# Patient Record
Sex: MALE | Race: WHITE | NOT HISPANIC OR LATINO | URBAN - METROPOLITAN AREA
[De-identification: names, ages, dates, MRNs, and addresses within clinical notes are randomized per-mention and may not be internally consistent; named-entity substitution may affect disease eponyms.]

---

## 2019-08-16 PROBLEM — Z00.00 ENCOUNTER FOR PREVENTIVE HEALTH EXAMINATION: Status: ACTIVE | Noted: 2019-08-16

## 2019-09-05 ENCOUNTER — OUTPATIENT (OUTPATIENT)
Dept: OUTPATIENT SERVICES | Facility: HOSPITAL | Age: 54
LOS: 1 days | Discharge: HOME | End: 2019-09-05

## 2019-09-05 DIAGNOSIS — D72.89 OTHER SPECIFIED DISORDERS OF WHITE BLOOD CELLS: ICD-10-CM

## 2019-09-06 ENCOUNTER — OUTPATIENT (OUTPATIENT)
Dept: OUTPATIENT SERVICES | Facility: HOSPITAL | Age: 54
LOS: 1 days | End: 2019-09-06
Payer: COMMERCIAL

## 2019-09-06 ENCOUNTER — RESULT REVIEW (OUTPATIENT)
Age: 54
End: 2019-09-06

## 2019-09-06 ENCOUNTER — APPOINTMENT (OUTPATIENT)
Dept: HEMATOLOGY ONCOLOGY | Facility: CLINIC | Age: 54
End: 2019-09-06
Payer: COMMERCIAL

## 2019-09-06 ENCOUNTER — TRANSCRIPTION ENCOUNTER (OUTPATIENT)
Age: 54
End: 2019-09-06

## 2019-09-06 VITALS
BODY MASS INDEX: 27.28 KG/M2 | RESPIRATION RATE: 16 BRPM | HEIGHT: 68.6 IN | HEART RATE: 81 BPM | TEMPERATURE: 97.5 F | OXYGEN SATURATION: 97 % | WEIGHT: 182.1 LBS | SYSTOLIC BLOOD PRESSURE: 111 MMHG | DIASTOLIC BLOOD PRESSURE: 68 MMHG

## 2019-09-06 DIAGNOSIS — D72.820 LYMPHOCYTOSIS (SYMPTOMATIC): ICD-10-CM

## 2019-09-06 DIAGNOSIS — F17.200 NICOTINE DEPENDENCE, UNSPECIFIED, UNCOMPLICATED: ICD-10-CM

## 2019-09-06 DIAGNOSIS — Z78.9 OTHER SPECIFIED HEALTH STATUS: ICD-10-CM

## 2019-09-06 LAB
EOSINOPHIL # BLD AUTO: 0.3 K/UL — SIGNIFICANT CHANGE UP (ref 0–0.5)
EOSINOPHIL NFR BLD AUTO: 2 % — SIGNIFICANT CHANGE UP (ref 0–6)
ERYTHROCYTE [SEDIMENTATION RATE] IN BLOOD: 5 MM/HR — SIGNIFICANT CHANGE UP (ref 0–20)
FOLATE SERPL-MCNC: >20 NG/ML
HCT VFR BLD CALC: 42.6 % — SIGNIFICANT CHANGE UP (ref 39–50)
HGB BLD-MCNC: 14.2 G/DL — SIGNIFICANT CHANGE UP (ref 13–17)
LYMPHOCYTES # BLD AUTO: 11.7 K/UL — HIGH (ref 1–3.3)
LYMPHOCYTES # BLD AUTO: 66 % — HIGH (ref 13–44)
MCHC RBC-ENTMCNC: 24.2 PG — LOW (ref 27–34)
MCHC RBC-ENTMCNC: 33.2 G/DL — SIGNIFICANT CHANGE UP (ref 32–36)
MCV RBC AUTO: 73 FL — LOW (ref 80–100)
MONOCYTES # BLD AUTO: 0.6 K/UL — SIGNIFICANT CHANGE UP (ref 0–0.9)
MONOCYTES NFR BLD AUTO: 5 % — SIGNIFICANT CHANGE UP (ref 2–14)
NEUTROPHILS # BLD AUTO: 3.8 K/UL — SIGNIFICANT CHANGE UP (ref 1.8–7.4)
NEUTROPHILS NFR BLD AUTO: 27 % — LOW (ref 43–77)
PLAT MORPH BLD: NORMAL — SIGNIFICANT CHANGE UP
PLATELET # BLD AUTO: 89 K/UL — LOW (ref 150–400)
RBC # BLD: 5.84 M/UL — HIGH (ref 4.2–5.8)
RBC # FLD: 14.6 % — HIGH (ref 10.3–14.5)
RBC BLD AUTO: SIGNIFICANT CHANGE UP
VIT B12 SERPL-MCNC: 693 PG/ML
WBC # BLD: 16.4 K/UL — HIGH (ref 3.8–10.5)
WBC # FLD AUTO: 16.4 K/UL — HIGH (ref 3.8–10.5)

## 2019-09-06 PROCEDURE — 87205 SMEAR GRAM STAIN: CPT

## 2019-09-06 PROCEDURE — 88184 FLOWCYTOMETRY/ TC 1 MARKER: CPT

## 2019-09-06 PROCEDURE — 88319 ENZYME HISTOCHEMISTRY: CPT

## 2019-09-06 PROCEDURE — 88185 FLOWCYTOMETRY/TC ADD-ON: CPT

## 2019-09-06 PROCEDURE — 88189 FLOWCYTOMETRY/READ 16 & >: CPT

## 2019-09-06 PROCEDURE — 99205 OFFICE O/P NEW HI 60 MIN: CPT

## 2019-09-06 NOTE — ASSESSMENT
[FreeTextEntry1] : 53 y/o M with no past medical history with lymphocytosis, also noted to be thrombocytopenic. \par \par -Other than unintentional weight loss, patient has no other symptoms of systemic illness.\par -The persistent nature of patient's lymphocytosis as well as his thrombocytopenia do make further evaluation appropriate. \par -No lymphadenopathy on exam. \par -Will check peripheral flow cytometry to assess if these lymphocytes are a monoclonal population. \par -CRP/ESR/CANDIDA to assess for systemic inflammation, as well as HIV and acute hepatitis panel although risk profile is low. \par -If not a monoclonal process, there is a chance that his smoking habit can be a possible cause. \par -Will review peripheral smear. \par -Will follow up in 1 month with repeat counts. May ultimately require bone marrow biopsy.

## 2019-09-06 NOTE — PHYSICAL EXAM
[Fully active, able to carry on all pre-disease performance without restriction] : Status 0 - Fully active, able to carry on all pre-disease performance without restriction [Normal] : affect appropriate [de-identified] : no lymphadenopathy noted in cervical, supraclavicular, axillary or inguinal chains.

## 2019-09-06 NOTE — CONSULT LETTER
[Dear  ___] : Dear  [unfilled], [Consult Letter:] : I had the pleasure of evaluating your patient, [unfilled]. [( Thank you for referring [unfilled] for consultation for _____ )] : Thank you for referring [unfilled] for consultation for [unfilled] [Please see my note below.] : Please see my note below. [Consult Closing:] : Thank you very much for allowing me to participate in the care of this patient.  If you have any questions, please do not hesitate to contact me. [Sincerely,] : Sincerely, [FreeTextEntry3] : Bradley Goldberg M.D. \par Hematology/Oncology\par [facility] [place]\par [phone]\par

## 2019-09-06 NOTE — REVIEW OF SYSTEMS
[Recent Change In Weight] : ~T recent weight change [Fever] : no fever [Chills] : no chills [Night Sweats] : no night sweats [Fatigue] : no fatigue [Eye Pain] : no eye pain [Vision Problems] : no vision problems [Nosebleeds] : no nosebleeds [Dysphagia] : no dysphagia [Hoarseness] : no hoarseness [Chest Pain] : no chest pain [Palpitations] : no palpitations [Wheezing] : no wheezing [Shortness Of Breath] : no shortness of breath [Cough] : no cough [Abdominal Pain] : no abdominal pain [Vomiting] : no vomiting [Constipation] : no constipation [Dysuria] : no dysuria [Joint Pain] : no joint pain [Joint Stiffness] : no joint stiffness [Confused] : no confusion [Skin Rash] : no skin rash [Dizziness] : no dizziness [Anxiety] : no anxiety [Depression] : no depression [Easy Bruising] : no tendency for easy bruising [Easy Bleeding] : no tendency for easy bleeding [Swollen Glands] : no swollen glands

## 2019-09-09 LAB — TM INTERPRETATION: SIGNIFICANT CHANGE UP

## 2019-09-11 LAB
ALBUMIN SERPL ELPH-MCNC: 4.8 G/DL
ALP BLD-CCNC: 93 U/L
ALT SERPL-CCNC: 22 U/L
ANA SER IF-ACNC: NEGATIVE
ANION GAP SERPL CALC-SCNC: 14 MMOL/L
AST SERPL-CCNC: 22 U/L
BILIRUB SERPL-MCNC: 0.5 MG/DL
BUN SERPL-MCNC: 19 MG/DL
CALCIUM SERPL-MCNC: 9.3 MG/DL
CHLORIDE SERPL-SCNC: 101 MMOL/L
CO2 SERPL-SCNC: 25 MMOL/L
CREAT SERPL-MCNC: 1.02 MG/DL
CRP SERPL-MCNC: 0.24 MG/DL
GLUCOSE SERPL-MCNC: 93 MG/DL
HAV IGM SER QL: NONREACTIVE
HBV CORE IGM SER QL: NONREACTIVE
HBV SURFACE AG SER QL: NONREACTIVE
HCV AB SER QL: NONREACTIVE
HCV S/CO RATIO: 0.19 S/CO
POTASSIUM SERPL-SCNC: 4.8 MMOL/L
PROT SERPL-MCNC: 7 G/DL
SODIUM SERPL-SCNC: 140 MMOL/L

## 2019-10-01 ENCOUNTER — RESULT REVIEW (OUTPATIENT)
Age: 54
End: 2019-10-01

## 2019-10-01 ENCOUNTER — APPOINTMENT (OUTPATIENT)
Dept: HEMATOLOGY ONCOLOGY | Facility: CLINIC | Age: 54
End: 2019-10-01
Payer: COMMERCIAL

## 2019-10-01 VITALS
BODY MASS INDEX: 27.04 KG/M2 | TEMPERATURE: 97.7 F | RESPIRATION RATE: 16 BRPM | WEIGHT: 181 LBS | SYSTOLIC BLOOD PRESSURE: 111 MMHG | DIASTOLIC BLOOD PRESSURE: 72 MMHG | OXYGEN SATURATION: 100 % | HEART RATE: 75 BPM

## 2019-10-01 DIAGNOSIS — R63.4 ABNORMAL WEIGHT LOSS: ICD-10-CM

## 2019-10-01 LAB
EOSINOPHIL # BLD AUTO: 0.1 K/UL — SIGNIFICANT CHANGE UP (ref 0–0.5)
EOSINOPHIL NFR BLD AUTO: 3 % — SIGNIFICANT CHANGE UP (ref 0–6)
HCT VFR BLD CALC: 43.7 % — SIGNIFICANT CHANGE UP (ref 39–50)
HGB BLD-MCNC: 14.7 G/DL — SIGNIFICANT CHANGE UP (ref 13–17)
LYMPHOCYTES # BLD AUTO: 11.5 K/UL — HIGH (ref 1–3.3)
LYMPHOCYTES # BLD AUTO: 59 % — HIGH (ref 13–44)
MCHC RBC-ENTMCNC: 24.8 PG — LOW (ref 27–34)
MCHC RBC-ENTMCNC: 33.6 G/DL — SIGNIFICANT CHANGE UP (ref 32–36)
MCV RBC AUTO: 73.6 FL — LOW (ref 80–100)
MONOCYTES # BLD AUTO: 0.6 K/UL — SIGNIFICANT CHANGE UP (ref 0–0.9)
MONOCYTES NFR BLD AUTO: 8 % — SIGNIFICANT CHANGE UP (ref 2–14)
NEUTROPHILS # BLD AUTO: 4.3 K/UL — SIGNIFICANT CHANGE UP (ref 1.8–7.4)
NEUTROPHILS NFR BLD AUTO: 30 % — LOW (ref 43–77)
PLAT MORPH BLD: NORMAL — SIGNIFICANT CHANGE UP
PLATELET # BLD AUTO: 103 K/UL — LOW (ref 150–400)
RBC # BLD: 5.94 M/UL — HIGH (ref 4.2–5.8)
RBC # FLD: 13.9 % — SIGNIFICANT CHANGE UP (ref 10.3–14.5)
RBC BLD AUTO: SIGNIFICANT CHANGE UP
WBC # BLD: 16.8 K/UL — HIGH (ref 3.8–10.5)
WBC # FLD AUTO: 16.8 K/UL — HIGH (ref 3.8–10.5)

## 2019-10-01 PROCEDURE — 99214 OFFICE O/P EST MOD 30 MIN: CPT

## 2019-10-01 NOTE — REVIEW OF SYSTEMS
[Recent Change In Weight] : ~T recent weight change [Fever] : no fever [Chills] : no chills [Night Sweats] : no night sweats [Eye Pain] : no eye pain [Fatigue] : no fatigue [Vision Problems] : no vision problems [Dysphagia] : no dysphagia [Hoarseness] : no hoarseness [Nosebleeds] : no nosebleeds [Chest Pain] : no chest pain [Palpitations] : no palpitations [Wheezing] : no wheezing [Shortness Of Breath] : no shortness of breath [Cough] : no cough [Vomiting] : no vomiting [Abdominal Pain] : no abdominal pain [Dysuria] : no dysuria [Constipation] : no constipation [Joint Stiffness] : no joint stiffness [Joint Pain] : no joint pain [Skin Rash] : no skin rash [Confused] : no confusion [Anxiety] : no anxiety [Dizziness] : no dizziness [Easy Bleeding] : no tendency for easy bleeding [Depression] : no depression [Swollen Glands] : no swollen glands [Easy Bruising] : no tendency for easy bruising

## 2019-10-01 NOTE — HISTORY OF PRESENT ILLNESS
[de-identified] : 55 y/o M with no past medical history presents as referral from PCP after persistent lymphocytosis noticed on his blood work at routine physical. He reports that he feels in his usual state of health. The only abnormality he has noticed is that he has unintentionally lost about 10 pounds since last year. He reports that normally he is not the biggest eater, and at times he will even skip meals, but this habit of his has not changed. He used to notice that on very hot and humid days he would get a feeling of lightheadedness but that actually stopped one and a half years ago. Otherwise, completely has felt normal. No lumps noticed on neck, chest, armpits, or groin. He reports no night sweats or increasing fatigue, no fevers or chills (although did note he had a day long cold two months ago during which he had a fever, this has resolved), denies any SOB or chest pain / palpitations. 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 \par \par On further evaluation, patient had a flow cytometry test which showed monotypic B-cells (45% of cells), positive for kappa (dim), CD19, dim CD20, CD23, CD5; they were negative for CD10, CD38 and FMC-7. 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  [de-identified] : Patient reports that he has not had any change in symptoms since his last visit. He generally feels well. No night sweats or chills, no fevers, no infections, his appetite is good but he generally cannot eat a lot. He reports no pain symptoms. No abdominal swelling. He feels normal strength. No bleeding symptoms.

## 2019-10-01 NOTE — PHYSICAL EXAM
[Fully active, able to carry on all pre-disease performance without restriction] : Status 0 - Fully active, able to carry on all pre-disease performance without restriction [Normal] : affect appropriate [de-identified] : no lymphadenopathy noted in cervical, supraclavicular, axillary or inguinal chains.

## 2019-10-01 NOTE — ASSESSMENT
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 \par \par -Will check FISH w/ CLL panel today, checking t(11;14) to rule out mantle cell lymphoma as well as looking for prognostic indicators. Also will check IGVH mutational analysis and cytogenetics. \par -Checking immunoglobulin levels as well as SPEP. \par -Will check LDH level. \par -Other than slight unintentional weight loss, patient has no other symptoms of systemic illness.\par -No lymphadenopathy or splenomegaly on exam. \par \par Discussed the possible diagnosis of CLL with the patient. Described that this is a chronic lymphocytic leukemia which can be slowly progressive in nature and may not require treatment for a long period of time. Discussed that the one high risk feature he has is thrombocytopenia (Stringer stage IV) and that he may require treatment earlier rather than late if this issue persists and worsens.

## 2019-10-02 ENCOUNTER — LABORATORY RESULT (OUTPATIENT)
Age: 54
End: 2019-10-02

## 2019-10-02 LAB
ALBUMIN MFR SERPL ELPH: 63.7 %
ALBUMIN SERPL-MCNC: 4.5 G/DL
ALBUMIN/GLOB SERPL: 1.8 RATIO
ALPHA1 GLOB MFR SERPL ELPH: 3.3 %
ALPHA1 GLOB SERPL ELPH-MCNC: 0.2 G/DL
ALPHA2 GLOB MFR SERPL ELPH: 8.7 %
ALPHA2 GLOB SERPL ELPH-MCNC: 0.6 G/DL
B-GLOBULIN MFR SERPL ELPH: 10.5 %
B-GLOBULIN SERPL ELPH-MCNC: 0.7 G/DL
B2 MICROGLOB SERPL-MCNC: 1.8 MG/L
DEPRECATED KAPPA LC FREE/LAMBDA SER: 1.31 RATIO
GAMMA GLOB FLD ELPH-MCNC: 1 G/DL
GAMMA GLOB MFR SERPL ELPH: 13.8 %
IGA SER QL IEP: 198 MG/DL
IGG SER QL IEP: 1021 MG/DL
IGM SER QL IEP: 57 MG/DL
INTERPRETATION SERPL IEP-IMP: NORMAL
KAPPA LC CSF-MCNC: 1.47 MG/DL
KAPPA LC SERPL-MCNC: 1.92 MG/DL
LDH SERPL-CCNC: 160 U/L
M PROTEIN SPEC IFE-MCNC: NORMAL
PROT SERPL-MCNC: 7 G/DL
PROT SERPL-MCNC: 7 G/DL

## 2019-10-29 LAB — IGVH MUTATION ANALYSIS: POSITIVE

## 2020-01-13 ENCOUNTER — OUTPATIENT (OUTPATIENT)
Dept: OUTPATIENT SERVICES | Facility: HOSPITAL | Age: 55
LOS: 1 days | Discharge: HOME | End: 2020-01-13

## 2020-01-13 DIAGNOSIS — D72.89 OTHER SPECIFIED DISORDERS OF WHITE BLOOD CELLS: ICD-10-CM

## 2020-01-14 ENCOUNTER — RESULT REVIEW (OUTPATIENT)
Age: 55
End: 2020-01-14

## 2020-01-14 ENCOUNTER — APPOINTMENT (OUTPATIENT)
Dept: HEMATOLOGY ONCOLOGY | Facility: CLINIC | Age: 55
End: 2020-01-14
Payer: COMMERCIAL

## 2020-01-14 VITALS
OXYGEN SATURATION: 99 % | SYSTOLIC BLOOD PRESSURE: 130 MMHG | RESPIRATION RATE: 16 BRPM | DIASTOLIC BLOOD PRESSURE: 72 MMHG | BODY MASS INDEX: 27.67 KG/M2 | TEMPERATURE: 98 F | HEART RATE: 80 BPM | WEIGHT: 185.19 LBS

## 2020-01-14 DIAGNOSIS — D69.6 THROMBOCYTOPENIA, UNSPECIFIED: ICD-10-CM

## 2020-01-14 LAB
BASOPHILS # BLD AUTO: 0 K/UL — SIGNIFICANT CHANGE UP (ref 0–0.2)
EOSINOPHIL # BLD AUTO: 0.2 K/UL — SIGNIFICANT CHANGE UP (ref 0–0.5)
HCT VFR BLD CALC: 43 % — SIGNIFICANT CHANGE UP (ref 39–50)
HGB BLD-MCNC: 14.5 G/DL — SIGNIFICANT CHANGE UP (ref 13–17)
LG PLATELETS BLD QL AUTO: SLIGHT — SIGNIFICANT CHANGE UP
LYMPHOCYTES # BLD AUTO: 12.5 K/UL — HIGH (ref 1–3.3)
LYMPHOCYTES # BLD AUTO: 79 % — HIGH (ref 13–44)
MCHC RBC-ENTMCNC: 24.5 PG — LOW (ref 27–34)
MCHC RBC-ENTMCNC: 33.8 G/DL — SIGNIFICANT CHANGE UP (ref 32–36)
MCV RBC AUTO: 72.5 FL — LOW (ref 80–100)
MONOCYTES # BLD AUTO: 0.6 K/UL — SIGNIFICANT CHANGE UP (ref 0–0.9)
MONOCYTES NFR BLD AUTO: 4 % — SIGNIFICANT CHANGE UP (ref 2–14)
NEUTROPHILS # BLD AUTO: 4 K/UL — SIGNIFICANT CHANGE UP (ref 1.8–7.4)
NEUTROPHILS NFR BLD AUTO: 17 % — LOW (ref 43–77)
PLAT MORPH BLD: NORMAL — SIGNIFICANT CHANGE UP
PLATELET # BLD AUTO: 105 K/UL — LOW (ref 150–400)
RBC # BLD: 5.93 M/UL — HIGH (ref 4.2–5.8)
RBC # FLD: 13.9 % — SIGNIFICANT CHANGE UP (ref 10.3–14.5)
RBC BLD AUTO: SIGNIFICANT CHANGE UP
SMUDGE CELLS # BLD: PRESENT — SIGNIFICANT CHANGE UP
WBC # BLD: 17.2 K/UL — HIGH (ref 3.8–10.5)
WBC # FLD AUTO: 17.2 K/UL — HIGH (ref 3.8–10.5)

## 2020-01-14 PROCEDURE — 99214 OFFICE O/P EST MOD 30 MIN: CPT

## 2020-01-14 NOTE — ASSESSMENT
[FreeTextEntry1] : 53 y/o M with no past medical history with lymphocytosis, also noted to be thrombocytopenic, and found on flow cytometry with evidence for CD5+ B cell lymphoproliferative disorder, consistent with CLL. \par \par -Discussed with patient the significance of findings on FISH, IGVH analysis and karyotype. 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 Karyotype with two clones which are largely uncharacterized in the literature, unknown contribution. \par -Immunoglobulins normal. LDH normal. \par -Patient doing very well in interim time period. \par -No lymphadenopathy or splenomegaly on exam. \par -Thrombocytopenia is stable today, will continue surveillance.

## 2020-01-14 NOTE — PHYSICAL EXAM
[Fully active, able to carry on all pre-disease performance without restriction] : Status 0 - Fully active, able to carry on all pre-disease performance without restriction [Normal] : affect appropriate [de-identified] : no lymphadenopathy noted in cervical, supraclavicular, axillary or inguinal chains.

## 2020-01-14 NOTE — REVIEW OF SYSTEMS
[Recent Change In Weight] : ~T recent weight change [Chills] : no chills [Fever] : no fever [Fatigue] : no fatigue [Night Sweats] : no night sweats [Vision Problems] : no vision problems [Eye Pain] : no eye pain [Hoarseness] : no hoarseness [Nosebleeds] : no nosebleeds [Dysphagia] : no dysphagia [Chest Pain] : no chest pain [Palpitations] : no palpitations [Shortness Of Breath] : no shortness of breath [Wheezing] : no wheezing [Abdominal Pain] : no abdominal pain [Cough] : no cough [Vomiting] : no vomiting [Constipation] : no constipation [Joint Pain] : no joint pain [Dysuria] : no dysuria [Joint Stiffness] : no joint stiffness [Confused] : no confusion [Skin Rash] : no skin rash [Anxiety] : no anxiety [Dizziness] : no dizziness [Depression] : no depression [Easy Bleeding] : no tendency for easy bleeding [Swollen Glands] : no swollen glands [Easy Bruising] : no tendency for easy bruising

## 2020-01-14 NOTE — HISTORY OF PRESENT ILLNESS
[Disease:__________________________] : Disease: [unfilled] [Stringer Stage: ___] : Stringer Stage: [unfilled] [de-identified] : 55 y/o M with no past medical history presents as referral from PCP after persistent lymphocytosis noticed on his blood work at routine physical. He reports that he feels in his usual state of health. The only abnormality he has noticed is that he has unintentionally lost about 10 pounds since last year. He reports that normally he is not the biggest eater, and at times he will even skip meals, but this habit of his has not changed. He used to notice that on very hot and humid days he would get a feeling of lightheadedness but that actually stopped one and a half years ago. Otherwise, completely has felt normal. No lumps noticed on neck, chest, armpits, or groin. He reports no night sweats or increasing fatigue, no fevers or chills (although did note he had a day long cold two months ago during which he had a fever, this has resolved), denies any SOB or chest pain / palpitations. 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 \par \par On further evaluation, patient had a flow cytometry test which showed monotypic B-cells (45% of cells), positive for kappa (dim), CD19, dim CD20, CD23, CD5; they were negative for CD10, CD38 and FMC-7. 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  [de-identified] : Peripheral blood:\par      - Monotypic B-cells (45% of cells), positive for kappa (dim), CD19, dim CD20, CD23, CD5; negative CD10, CD38, FMC-7. Please see interpretation.\par \par INTERPRETATION:\par MORPHOLOGY: Absolute lymphocytosis, predominantly small cells\par \par IMMUNOPHENOTYPE: Monotypic B-cells (45% of cells), positive for kappa (dim), CD19, dim CD20, CD23, CD5; negative CD10, CD38, FMC-7. CD38 is positive in less than 1% of CD19+ cells.  Small population of polytypic B cells (1.5% of cells) and T cells (6% of cells) with normal CD4 to CD8 ratio are also present.\par \par The findings are consistent with CD5 positive B-cell lymphoproliferative disorder, most consistent with CLL/SLL. If indicated, suggest CLL FISH panel to rule out mantle cell lymphoma (includes FISH for t(11;14)) and to evaluate for prognostic factors associated with CLL. [de-identified] : IV [de-identified] : Patient reports that he has not had any change in symptoms since his last visit. He generally feels well. He has changed his diet to be more healthy, he has completely stopped drinking alcohol. He reports no shortness of breath, no chest pain, no bleeding symptoms, and no fevers or chills. No recent infections. [FreeTextEntry1] : On surveillance\par  [de-identified] : IVGH mutated\par FISH negative\par Karyotype: 46,XY,demond(15)t(15;?)(q26;?)[4]/47,idem,+22[4]/46,XY[12]

## 2020-04-14 ENCOUNTER — OUTPATIENT (OUTPATIENT)
Dept: OUTPATIENT SERVICES | Facility: HOSPITAL | Age: 55
LOS: 1 days | Discharge: HOME | End: 2020-04-14

## 2020-04-14 DIAGNOSIS — D72.89 OTHER SPECIFIED DISORDERS OF WHITE BLOOD CELLS: ICD-10-CM

## 2020-04-21 ENCOUNTER — APPOINTMENT (OUTPATIENT)
Dept: HEMATOLOGY ONCOLOGY | Facility: CLINIC | Age: 55
End: 2020-04-21
Payer: COMMERCIAL

## 2020-04-21 ENCOUNTER — APPOINTMENT (OUTPATIENT)
Dept: HEMATOLOGY ONCOLOGY | Facility: CLINIC | Age: 55
End: 2020-04-21

## 2020-04-21 DIAGNOSIS — D72.820 LYMPHOCYTOSIS (SYMPTOMATIC): ICD-10-CM

## 2020-04-21 PROCEDURE — 99442: CPT

## 2020-05-19 ENCOUNTER — LABORATORY RESULT (OUTPATIENT)
Age: 55
End: 2020-05-19

## 2020-06-03 LAB
ALBUMIN SERPL ELPH-MCNC: 4.7 G/DL
ALP BLD-CCNC: 94 U/L
ALT SERPL-CCNC: 26 U/L
ANION GAP SERPL CALC-SCNC: 13 MMOL/L
AST SERPL-CCNC: 20 U/L
BASOPHILS # BLD AUTO: 0 K/UL
BASOPHILS NFR BLD AUTO: 0 %
BILIRUB SERPL-MCNC: 0.5 MG/DL
BUN SERPL-MCNC: 22 MG/DL
CALCIUM SERPL-MCNC: 9.5 MG/DL
CHLORIDE SERPL-SCNC: 101 MMOL/L
CO2 SERPL-SCNC: 29 MMOL/L
CREAT SERPL-MCNC: 1.09 MG/DL
EOSINOPHIL # BLD AUTO: 0.17 K/UL
EOSINOPHIL NFR BLD AUTO: 1 %
GLUCOSE SERPL-MCNC: 121 MG/DL
HCT VFR BLD CALC: 45.6 %
HGB BLD-MCNC: 14.1 G/DL
LDH SERPL-CCNC: 169 U/L
LYMPHOCYTES # BLD AUTO: 14.17 K/UL
LYMPHOCYTES NFR BLD AUTO: 84 %
MAN DIFF?: NORMAL
MCHC RBC-ENTMCNC: 23.9 PG
MCHC RBC-ENTMCNC: 30.9 GM/DL
MCV RBC AUTO: 77.2 FL
MONOCYTES # BLD AUTO: 0.51 K/UL
MONOCYTES NFR BLD AUTO: 3 %
NEUTROPHILS # BLD AUTO: 2.02 K/UL
NEUTROPHILS NFR BLD AUTO: 12 %
PLATELET # BLD AUTO: 99 K/UL
POTASSIUM SERPL-SCNC: 4.4 MMOL/L
PROT SERPL-MCNC: 6.9 G/DL
RBC # BLD: 5.91 M/UL
RBC # FLD: 16.1 %
SODIUM SERPL-SCNC: 142 MMOL/L
WBC # FLD AUTO: 16.87 K/UL

## 2021-09-07 ENCOUNTER — APPOINTMENT (OUTPATIENT)
Dept: THORACIC SURGERY | Facility: CLINIC | Age: 56
End: 2021-09-07
Payer: COMMERCIAL

## 2021-09-07 ENCOUNTER — NON-APPOINTMENT (OUTPATIENT)
Age: 56
End: 2021-09-07

## 2021-09-07 VITALS — HEIGHT: 68 IN | BODY MASS INDEX: 29.86 KG/M2 | WEIGHT: 197 LBS

## 2021-09-07 DIAGNOSIS — Z12.2 ENCOUNTER FOR SCREENING FOR MALIGNANT NEOPLASM OF RESPIRATORY ORGANS: ICD-10-CM

## 2021-09-07 PROCEDURE — G0296 VISIT TO DETERM LDCT ELIG: CPT

## 2021-09-08 ENCOUNTER — APPOINTMENT (OUTPATIENT)
Dept: CT IMAGING | Facility: CLINIC | Age: 56
End: 2021-09-08
Payer: COMMERCIAL

## 2021-09-08 ENCOUNTER — OUTPATIENT (OUTPATIENT)
Dept: OUTPATIENT SERVICES | Facility: HOSPITAL | Age: 56
LOS: 1 days | End: 2021-09-08
Payer: COMMERCIAL

## 2021-09-08 DIAGNOSIS — Z00.8 ENCOUNTER FOR OTHER GENERAL EXAMINATION: ICD-10-CM

## 2021-09-08 PROCEDURE — 71271 CT THORAX LUNG CANCER SCR C-: CPT

## 2021-09-08 PROCEDURE — 71271 CT THORAX LUNG CANCER SCR C-: CPT | Mod: 26

## 2021-09-20 NOTE — PLAN
[FreeTextEntry1] : HIs LDCT is scheduled for 9/8/21 at the [place] location.  He will follow up with Dr. Moreno for the results.

## 2021-09-20 NOTE — HISTORY OF PRESENT ILLNESS
[TextBox_13] : He denies hemoptysis, denies new cough, denies unexplained weight loss.\par He is a former smoker with a 21 pack year smoking history (0.5PPD x 41 years).  He quit January 2021.  He has a h/o CLL.   He is referred by Dr. Naveed brand.

## 2024-06-12 ENCOUNTER — APPOINTMENT (OUTPATIENT)
Dept: PULMONOLOGY | Facility: CLINIC | Age: 59
End: 2024-06-12
Payer: COMMERCIAL

## 2024-06-12 VITALS
DIASTOLIC BLOOD PRESSURE: 97 MMHG | WEIGHT: 197.5 LBS | HEART RATE: 84 BPM | SYSTOLIC BLOOD PRESSURE: 137 MMHG | OXYGEN SATURATION: 96 % | BODY MASS INDEX: 29.93 KG/M2 | RESPIRATION RATE: 16 BRPM | HEIGHT: 68 IN

## 2024-06-12 DIAGNOSIS — R06.83 SNORING: ICD-10-CM

## 2024-06-12 DIAGNOSIS — Z87.898 PERSONAL HISTORY OF OTHER SPECIFIED CONDITIONS: ICD-10-CM

## 2024-06-12 LAB — POCT - HEMOGLOBIN (HGB), QUANTITATIVE, TRANSCUTANEOUS: 13.9

## 2024-06-12 PROCEDURE — 94010 BREATHING CAPACITY TEST: CPT

## 2024-06-12 PROCEDURE — 88738 HGB QUANT TRANSCUTANEOUS: CPT

## 2024-06-12 PROCEDURE — 94727 GAS DIL/WSHOT DETER LNG VOL: CPT

## 2024-06-12 PROCEDURE — 99204 OFFICE O/P NEW MOD 45 MIN: CPT | Mod: 25

## 2024-06-12 PROCEDURE — ZZZZZ: CPT

## 2024-06-12 PROCEDURE — 94729 DIFFUSING CAPACITY: CPT

## 2024-06-12 NOTE — ASSESSMENT
[FreeTextEntry1] : eval for jesus manuel with hst  obtain updated lung ca screening exam  A total of 45 minutes was spent on this encounter including history taking, chart review, physical examination, testing interpretation and treatment plan.

## 2024-06-12 NOTE — PROCEDURE
[FreeTextEntry1] : PFT: Normal spirometry.  Lung volumes normal. DLCO normal.  Prior exams reviewed:  EXAM: CT LDCT LUNG CA SCREENING   PROCEDURE DATE: 09/08/2021    INTERPRETATION: Clinical information: 21 pack-year history of cigarette smoking. Patient is a former smoker. Lung cancer screening.  Low-dose CT scan of the chest was obtained without administration of intravenous contrast.  No hilar or mediastinal adenopathy is noted.  Heart is normal in size. No pericardial effusion is noted.  No endobronchial lesions are noted. No nodules are noted. No pleural effusions are noted. Left hemidiaphragm is elevated.  Below the diaphragm, visualized portions of the abdomen are unremarkable.  Degenerative changes of the spine are noted.  IMPRESSION: No pulmonary nodules are noted.  Lung RADS 1: Category negative  Continued screening with low-dose CT scan of the chest in one year.  --- End of Report ---       RAF ALEGRIA MD; Attending Radiologist This document has been electronically signed. Sep 10 2021 10:46AM

## 2024-06-12 NOTE — HISTORY OF PRESENT ILLNESS
[Former] : former [TextBox_4] : 59M hx of smoking, here for jesus manuel testing  reports loud snoring choking/gasping during sleep never feels well rested will take naps to get better rest never tested for jesus manuel [TextBox_11] : 1/2 [TextBox_13] : 40 [YearQuit] : 2020

## 2024-06-27 ENCOUNTER — NON-APPOINTMENT (OUTPATIENT)
Age: 59
End: 2024-06-27

## 2024-06-27 VITALS — WEIGHT: 197 LBS | BODY MASS INDEX: 29.86 KG/M2 | HEIGHT: 68 IN

## 2024-06-27 DIAGNOSIS — Z87.891 PERSONAL HISTORY OF NICOTINE DEPENDENCE: ICD-10-CM

## 2024-07-01 ENCOUNTER — OUTPATIENT (OUTPATIENT)
Dept: OUTPATIENT SERVICES | Facility: HOSPITAL | Age: 59
LOS: 1 days | End: 2024-07-01
Payer: COMMERCIAL

## 2024-07-01 ENCOUNTER — APPOINTMENT (OUTPATIENT)
Dept: CT IMAGING | Facility: CLINIC | Age: 59
End: 2024-07-01
Payer: COMMERCIAL

## 2024-07-01 DIAGNOSIS — Z87.891 PERSONAL HISTORY OF NICOTINE DEPENDENCE: ICD-10-CM

## 2024-07-01 PROCEDURE — 71271 CT THORAX LUNG CANCER SCR C-: CPT

## 2024-07-01 PROCEDURE — 71271 CT THORAX LUNG CANCER SCR C-: CPT | Mod: 26

## 2024-07-09 ENCOUNTER — TRANSCRIPTION ENCOUNTER (OUTPATIENT)
Age: 59
End: 2024-07-09

## 2024-07-23 ENCOUNTER — APPOINTMENT (OUTPATIENT)
Dept: PULMONOLOGY | Facility: CLINIC | Age: 59
End: 2024-07-23
Payer: COMMERCIAL

## 2024-07-24 PROCEDURE — 95800 SLP STDY UNATTENDED: CPT | Mod: 52

## 2024-07-25 PROCEDURE — 95800 SLP STDY UNATTENDED: CPT

## 2024-08-20 ENCOUNTER — APPOINTMENT (OUTPATIENT)
Dept: PULMONOLOGY | Facility: CLINIC | Age: 59
End: 2024-08-20
Payer: COMMERCIAL

## 2024-08-20 VITALS — OXYGEN SATURATION: 96 % | SYSTOLIC BLOOD PRESSURE: 129 MMHG | DIASTOLIC BLOOD PRESSURE: 74 MMHG | HEART RATE: 74 BPM

## 2024-08-20 DIAGNOSIS — G47.33 OBSTRUCTIVE SLEEP APNEA (ADULT) (PEDIATRIC): ICD-10-CM

## 2024-08-20 DIAGNOSIS — R91.1 SOLITARY PULMONARY NODULE: ICD-10-CM

## 2024-08-20 PROCEDURE — 99214 OFFICE O/P EST MOD 30 MIN: CPT

## 2024-08-20 PROCEDURE — G2211 COMPLEX E/M VISIT ADD ON: CPT | Mod: NC

## 2024-08-20 NOTE — PROCEDURE
[FreeTextEntry1] : HST: moderate jesus manuel with desats to 70's  Prior exams reviewed:    	 EXAM: [id number] - CT LDCT LUNG CA SCREENING  - ORDERED BY: DANIEL BANEGAS   PROCEDURE DATE:  07/01/2024    INTERPRETATION:  SMOKING HISTORY: 59 year old with 20 pack year smoking history; quit smoking 2020  TECHNIQUE: Low-dose CT scan of the chest was obtained without administration of intravenous contrast.  COMPARISON: CT lung cancer screening 9/8/2021  FINDINGS:  LUNG NODULES: Stable 2 mm nodule along the right minor fissure on 2:63, likely a small intrapulmonary lymph node. No new nodules.  LUNG PARENCHYMA/AIRWAYS/PLEURA: The central airways are patent. No pleural effusion. The lungs are clear.  MEDIASTINUM/LYMPH NODES: No thoracic lymphadenopathy.  HEART AND GREAT VESSELS: The heart is normal in size. There is no pericardial effusion. The great vessels are normal in size. No coronary artery calcification.  UPPER ABDOMEN: The low dose technique limits diagnostic evaluation of the organs of the upper abdomen: No gross abnormality is detected in the visualized upper abdomen.  BONES/SOFT TISSUES: No aggressive osseous lesion.  IMPRESSION: Stable 2 mm right perifissural nodule, likely a small intrapulmonary lymph node.  No new nodules.  SCREENING RECOMMENDATIONS: 12-month screening LDCT, if the patient continues to meet eligibility criteria  LungRADS category: 2 Benign  Reported using the American College of [employer] Lung Imaging Reporting and Data System (Lung-RADS) version 2022  --- End of Report ---       ABBEY CAGE M.D., Attending Radiologist This document has been electronically signed. Jul 8 2024 10:24PM  PFT: Normal spirometry.  Lung volumes normal. DLCO normal.   EXAM: CT LDCT LUNG CA SCREENING   PROCEDURE DATE: 09/08/2021    INTERPRETATION: Clinical information: 21 pack-year history of cigarette smoking. Patient is a former smoker. Lung cancer screening.  Low-dose CT scan of the chest was obtained without administration of intravenous contrast.  No hilar or mediastinal adenopathy is noted.  Heart is normal in size. No pericardial effusion is noted.  No endobronchial lesions are noted. No nodules are noted. No pleural effusions are noted. Left hemidiaphragm is elevated.  Below the diaphragm, visualized portions of the abdomen are unremarkable.  Degenerative changes of the spine are noted.  IMPRESSION: No pulmonary nodules are noted.  Lung RADS 1: Category negative  Continued screening with low-dose CT scan of the chest in one year.  --- End of Report ---       RAF ALEGRIA MD; Attending Radiologist This document has been electronically signed. Sep 10 2021 10:46AM

## 2024-09-20 NOTE — HISTORY OF PRESENT ILLNESS
Airway       Patient location during procedure: OR  Staff -        Anesthesiologist:  Darren Treadwell MD       CRNA: Dorita Li APRN CRNA       Performed By: CRNAIndications and Patient Condition       Indications for airway management: ulysses-procedural       Induction type:intravenous       Mask difficulty assessment: 1 - vent by mask    Final Airway Details       Final airway type: supraglottic airway    Supraglottic Airway Details        Type: LMA       LMA size: 4    Post intubation assessment        Placement verified by: capnometry, equal breath sounds and chest rise        Number of attempts at approach: 1       Secured with: tape       Ease of procedure: easy       Dentition: Intact and Unchanged         [de-identified] : 53 y/o M with no past medical history presents as referral from PCP after persistent lymphocytosis noticed on his blood work at routine physical. He reports that he feels in his usual state of health. The only abnormality he has noticed is that he has unintentionally lost about 10 pounds since last year. He reports that normally he is not the biggest eater, and at times he will even skip meals, but this habit of his has not changed. He used to notice that on very hot and humid days he would get a feeling of lightheadedness but that actually stopped one and a half years ago. Otherwise, completely has felt normal. No lumps noticed on neck, chest, armpits, or groin. He reports no night sweats or increasing fatigue, no fevers or chills (although did note he had a day long cold two months ago during which he had a fever, this has resolved), denies any SOB or chest pain / palpitations. 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